# Patient Record
Sex: MALE | URBAN - METROPOLITAN AREA
[De-identification: names, ages, dates, MRNs, and addresses within clinical notes are randomized per-mention and may not be internally consistent; named-entity substitution may affect disease eponyms.]

---

## 2020-08-04 ENCOUNTER — TRANSFERRED RECORDS (OUTPATIENT)
Dept: HEALTH INFORMATION MANAGEMENT | Facility: CLINIC | Age: 25
End: 2020-08-04

## 2020-08-05 ENCOUNTER — TRANSCRIBE ORDERS (OUTPATIENT)
Dept: OTHER | Age: 25
End: 2020-08-05

## 2020-08-05 DIAGNOSIS — M54.50 LUMBAR PAIN: Primary | ICD-10-CM

## 2020-08-10 NOTE — TELEPHONE ENCOUNTER
DIAGNOSIS: Lumbar Pain - Referred by Dr. Halle Nichols from [facility]   APPOINTMENT DATE: 10/1/2020   NOTES STATUS DETAILS   OFFICE NOTE from referring provider Care Everywhere 7/22/20 - [facility] OV with Dr. Nichols   OFFICE NOTE from other specialist Care Everywhere [facility]:  12/19/18 - Rehab OV with Minal Goldman NP  12/11/18 - [facility] OV with Yordy Quintana DO   DISCHARGE SUMMARY from hospital Care Everywhere [facility]:  12/4/18 - UC OV with Dr. Judge   DISCHARGE REPORT from the ER Care Everywhere [facility] ED  7/21/20 - ED visit with Alicia Aguilar NP   OPERATIVE REPORT N/A    MEDICATION LIST Care Everywhere    EMG (for Spine) N/A    IMPLANT RECORD/STICKER N/A    LABS     CBC/DIFF N/A    CULTURES N/A    INJECTIONS DONE IN RADIOLOGY N/A    MRI Received [facility]:  7/22/20 - MRI Lumbar Spine W/WO   CT SCAN N/A    XRAYS (IMAGES & REPORTS) Received [facility]:  12/4/18 - XR Cervical Spine 4-5 Views  12/4/18 - XR Thoracic Spine 3 Views   TUMOR     PATHOLOGY  Slides & report N/A      Records Requested  08/10/20    Facility  [facility]  Fax: [phone]   Outcome * 8/10/20 3:59 PM Faxed request to [facility] for images to be pushed to [employer] [facility]. - Jacinta    * 8/11/20 3:25 PM Images received from [facility] and attached to patient in PACs - Jacinta   02/10/21   10:44 AM   Called [facility] and asked for images  Jaye De La Fuente CMA

## 2020-09-29 DIAGNOSIS — M54.50 LUMBAR PAIN: Primary | ICD-10-CM

## 2020-10-01 ENCOUNTER — PRE VISIT (OUTPATIENT)
Dept: ORTHOPEDICS | Facility: CLINIC | Age: 25
End: 2020-10-01

## 2021-02-08 DIAGNOSIS — M54.50 LUMBAR PAIN: Primary | ICD-10-CM
